# Patient Record
Sex: MALE | Race: WHITE | URBAN - METROPOLITAN AREA
[De-identification: names, ages, dates, MRNs, and addresses within clinical notes are randomized per-mention and may not be internally consistent; named-entity substitution may affect disease eponyms.]

---

## 2017-01-31 ENCOUNTER — TRANSFERRED RECORDS (OUTPATIENT)
Dept: HEALTH INFORMATION MANAGEMENT | Facility: CLINIC | Age: 52
End: 2017-01-31

## 2017-11-21 ENCOUNTER — TRANSFERRED RECORDS (OUTPATIENT)
Dept: HEALTH INFORMATION MANAGEMENT | Facility: CLINIC | Age: 52
End: 2017-11-21

## 2017-12-24 ENCOUNTER — MEDICAL CORRESPONDENCE (OUTPATIENT)
Dept: HEALTH INFORMATION MANAGEMENT | Facility: CLINIC | Age: 52
End: 2017-12-24

## 2018-01-04 NOTE — TELEPHONE ENCOUNTER
APPT INFO    Date /Time: 1/15/18 2PM   Reason for Appt: Rt Hand 3rd MCP OA   Ref Provider/Clinic: Dr. Felipe Patrick   Are there internal records? Yes/No?  IF YES, list clinic names: no   Are there outside records? Yes/No? Yes - see below   Patient Contact (Y/N) & Call Details: No - pt is referred   [facility]     OUTSIDE RECORDS CHECKLIST     CLINIC NAME COMMENTS REC (x) IMG (x)   [facility]  x

## 2018-01-04 NOTE — TELEPHONE ENCOUNTER
APPT INFO    Date /Time: 1/15/18 2PM   Reason for Appt: Rt hand 3rd MCP Osteoarthritis     Ref Provider/Clinic: SOULEYMANE FELIZ/ [facility]   Are there internal records? Yes/No?  IF YES, list clinic names: NO   Are there outside records? Yes/No? YES- see below   Patient Contact (Y/N) & Call Details: NO- referral   Action: Reviewed records     OUTSIDE RECORDS CHECKLIST     CLINIC NAME COMMENTS REC (x) IMG (x)   [facility]  x x           Records Received From: [facility]    Date/Exam/Location  (specify location if different)   Office Notes: 11/21/17   Missing: Additional records/imaging?

## 2018-01-09 NOTE — TELEPHONE ENCOUNTER
Records Received From: [facility]    Date/Exam/Location  (specify location if different)   Office Notes: 11/21/17, 3/14/17, 1/31/17, 1/9/17   Radiology Reports: Right hand 1/31/17  Cleopatra Alarcon Notified (Y/N): no

## 2018-01-09 NOTE — TELEPHONE ENCOUNTER
Received Imaging From: [facility]     Image Type (x): Disc:_X__  Pacs:___      Exam Date/Name: Right hand 1/31/17 Comments: Received 1 CD, will send to Film room along with radiology report attached.

## 2018-01-15 ENCOUNTER — RADIANT APPOINTMENT (OUTPATIENT)
Dept: GENERAL RADIOLOGY | Facility: CLINIC | Age: 53
End: 2018-01-15
Attending: ORTHOPAEDIC SURGERY
Payer: COMMERCIAL

## 2018-01-15 ENCOUNTER — OFFICE VISIT (OUTPATIENT)
Dept: ORTHOPEDICS | Facility: CLINIC | Age: 53
End: 2018-01-15
Payer: COMMERCIAL

## 2018-01-15 ENCOUNTER — PRE VISIT (OUTPATIENT)
Dept: ORTHOPEDICS | Facility: CLINIC | Age: 53
End: 2018-01-15

## 2018-01-15 VITALS — HEIGHT: 71 IN | WEIGHT: 187.7 LBS | BODY MASS INDEX: 26.28 KG/M2

## 2018-01-15 DIAGNOSIS — M79.641 PAIN OF RIGHT HAND: ICD-10-CM

## 2018-01-15 DIAGNOSIS — M19.041 OSTEOARTHRITIS, HAND, PRIMARY LOCALIZED, RIGHT: Primary | ICD-10-CM

## 2018-01-15 DIAGNOSIS — M79.641 PAIN OF RIGHT HAND: Primary | ICD-10-CM

## 2018-01-15 NOTE — NURSING NOTE
"Reason For Visit:   Chief Complaint   Patient presents with     Hand Pain     pt states that his right middle finger has arthritis pt states he has strength to hold anything just fustrating would like to discuss surgery.       Primary MD: Demian Naranjo  Ref. MD:     ?  No    Age: 52 year old    Occupation:.  Currently working? Yes.  Work status?  Full time.  Smoker: Yes  Request smoking cessation information: No      Ht 1.803 m (5' 11\")  Wt 85.1 kg (187 lb 11.2 oz)  BMI 26.18 kg/m2      Pain Assessment  Patient Currently in Pain: Yes  0-10 Pain Scale: 2  Primary Pain Location: Finger (Comment which one)  Pain Orientation: Right    Hand Dominance Evaluation  Hand Dominance: Left            No Known Allergies    Anastacio Rivera CMA    "

## 2018-01-15 NOTE — PROGRESS NOTES
Date of Service: Zain 15, 2018    Chief Complaint:   Chief Complaint   Patient presents with     Hand Pain     pt states that his right middle finger has arthritis pt states he has strength to hold anything just fustrating would like to discuss surgery.       History of Present Illness: Sebastián Queen is a 52 year old, left handed male who presents today for further evaluation of right middle finger MCP joint arthritis. He reports that he has known he has had this problem for many years. [facility] and  it appears that he was seen by Dr. Murray. At one point, a surface replacement arthroplasty was offered but the process was delayed and ultimately, [facility]. He reports that the third MCP joint is swollen and he cannot flex it fully. When he tries to make a full composite fist, the middle finger gets in the way because he cannot get it all the way down. He feels that the finger is causing his  strength to be weak and he no longer feels secure climbing a left bladder as a result. He denies any history of injury to the finger. He reports that he only has very minimal pain, mostly when he over uses it. His primary complaint is dysfunction rather than discomfort. He has not had any treatment with therapy or injections to date. He initially was hoping to have surgery in February or March but is now not sure to increasing john work this time of year.    Review of Systems: A 14-point review of systems was obtained on intake reviewed. It is included at the bottom of this note.     Past Medical History:   Diagnosis Date     Eye trauma, superficial, right, initial encounter 2008         Past Surgical History:   Procedure Laterality Date     BLEPHAROPLASTY Left 10/6/2016    Procedure: BLEPHAROPLASTY;  Surgeon: Giovanni Hamilton MD;  Location:  OR         Current Outpatient Prescriptions:      erythromycin (ROMYCIN) ophthalmic ointment, Apply small amount to incision  "sites three times daily and into operative eye(s) at night., Disp: 3.5 g, Rfl: 0     HYDROcodone-acetaminophen (NORCO) 5-325 MG per tablet, Take 1 tablet by mouth every 6 hours as needed for pain Maximum of 4000 mg of acetaminophen in 24 hours., Disp: 15 tablet, Rfl: 0    No Known Allergies    Social History     Social History     Marital status: Single     Spouse name: N/A     Number of children: N/A     Years of education: N/A     Social History Main Topics     Smoking status: Light Tobacco Smoker     Packs/day: 0.00     Types: Cigarettes     Smokeless tobacco: None      Comment: 1 cigarette/day     Alcohol use Yes      Comment: 2-3 beers/day . (Nothing the week before surg. 10/01/16     Drug use: No     Sexual activity: Not Asked     Other Topics Concern     None     Social History Narrative     Occupation:     Family History   Problem Relation Age of Onset     CANCER Father        Physical examination:  Height 1.803 m (5' 11\"), weight 85.1 kg (187 lb 11.2 oz).    Well-developed, well-nourished and in no acute distress.  Alert and oriented to surroundings.    On examination of the right hand, skin is clean, dry, and intact. There is swelling of the third MCP joint. There is no other obvious deformity. There is no erythema. MCP joint is stable to varus and valgus stress. He is able to make a full composite fist with all but the middle finger, she cannot get down all the way. Passive and active motion are equal. DIP motion is from 0-80 , PIP motion is from 0-100 , and MP motion is from 0-70 . There is pain with MCP grind. Sensation is intact in median, radial, and ulnar nerve distributions.      Radiographs: Three views of the right hand were obtained and reviewed. These demonstrate joint space narrowing and slight subluxation of the third MCP joint.      Assessment: 52 year old male with third MCP osteoarthritis    Plan:  At this point, the patient reports he has minimal pain. His primary " complaint is weakness and loss of motion. He has not had any injections or therapy. I discussed with him that the primary indication for MP arthroplasty is pain. It may also help with his range of motion and strength, but the prognosis for this is more guarded. At this point, especially as he is not sure whether or not this would be a good time for him to have surgery, my recommendation would be an injection and hand therapy. If this is not successful, at that point, we can readdress the question of surgical intervention if he so desires. The patient was amenable to this plan. I will put in a referral for him for therapy. He would also like to go ahead and have the injection. However, he is an important business trip coming up tomorrow and wants to be in his best form for this. Therefore he will come back in 3 weeks from today for the procedure.  Answers for HPI/ROS submitted by the patient on 1/15/2018   General Symptoms: No  Skin Symptoms: No  HENT Symptoms: No  EYE SYMPTOMS: No  HEART SYMPTOMS: No  LUNG SYMPTOMS: No  INTESTINAL SYMPTOMS: No  URINARY SYMPTOMS: No  REPRODUCTIVE SYMPTOMS: No  SKELETAL SYMPTOMS: No  BLOOD SYMPTOMS: No  NERVOUS SYSTEM SYMPTOMS: No  MENTAL HEALTH SYMPTOMS: No

## 2018-01-15 NOTE — MR AVS SNAPSHOT
After Visit Summary   1/15/2018    Sebastián Queen    MRN: [id number]           Patient Information     Date Of Birth          1965        Visit Information        Provider Department      1/15/2018 2:00 PM Td Rodriguez MD [facility]        Today's Diagnoses     Osteoarthritis, hand, primary localized, right    -  1       Follow-ups after your visit        Additional Services     HAND THERAPY       Right third MCP arthritis. Primary complaint weakness. Please evaluate for possible strengthening, bracing, or modalities that may be helpful in patient's job as a .                  Your next 10 appointments already scheduled     Feb 07, 2018  2:00 PM CST   (Arrive by 1:45 PM)   OLMAN Hand with Amna Bolden OT   [facility] ([facility])    [address] [place] [id number] [phone]            Feb 07, 2018  3:45 PM CST   (Arrive by 3:30 PM)   RETURN HAND with Td Rodriguez MD   [facility] ([facility])    [address] [place] [address] [phone]              Who to contact     Please call your clinic at [phone] to:    Ask questions about your health    Make or cancel appointments    Discuss your medicines    Learn about your test results    Speak to your doctor   If you have compliments or concerns about an experience at your clinic, or if you wish to file a complaint, please contact [facility] Patient Relations at [phone] or email us at [email or web address]         Additional Information About Your Visit        MyChart Information     [employer] is an electronic gateway that provides easy, online access to your medical records. With [employer], you can request a clinic appointment, read your test results, renew a prescription or communicate with your care team.    "  To sign up for [employer] visit the website at [email or web address]   You will be asked to enter the access code listed below, as well as some personal information. Please follow the directions to create your username and password.     Your access code is: [email or web address]  Expires: 2018  6:30 AM     Your access code will  in 90 days. If you need help or a new code, please contact your [facility] or call [phone] for assistance.        Care EveryWhere ID     This is your Care EveryWhere ID. This could be used by other organizations to access your [place] medical records  [id number]        Your Vitals Were     Height BMI (Body Mass Index)                1.803 m (5' 11\") 26.18 kg/m2           Blood Pressure from Last 3 Encounters:   10/06/16 (!) 147/96    Weight from Last 3 Encounters:   01/15/18 85.1 kg (187 lb 11.2 oz)   10/06/16 84.8 kg (187 lb)              We Performed the Following     HAND THERAPY        Primary Care Provider Office Phone # Fax #    Demian Crowley Jose A [phone]       [facility] [address]        Equal Access to Services     BRAN BRUCE AH: Hadii aad ku hadasho Soomaali, waaxda luqadaha, qaybta kaalmada adeegyada, waxay murtaza haymagalin romana brock. So [facility] [phone].    ATENCIÓN: Si habla español, tiene a herrmann disposición servicios gratuitos de asistencia lingüística. Llame al [phone].    We comply with applicable federal civil rights laws and [place] laws. We do not discriminate on the basis of race, color, national origin, age, disability, sex, sexual orientation, or gender identity.            Thank you!     Thank you for choosing [facility]  for your care. Our goal is always to provide you with excellent care. Hearing back from our patients is one way we can continue to improve our services. Please take a few minutes to complete the written survey that you may " receive in the mail after your visit with us. Thank you!             Your Updated Medication List - Protect others around you: Learn how to safely use, store and throw away your medicines at www.disposemymeds.org.          This list is accurate as of: 1/15/18 11:59 PM.  Always use your most recent med list.                   Brand Name Dispense Instructions for use Diagnosis    erythromycin ophthalmic ointment    ROMYCIN    3.5 g    Apply small amount to incision sites three times daily and into operative eye(s) at night.    Postoperative eye state       HYDROcodone-acetaminophen 5-325 MG per tablet    NORCO    15 tablet    Take 1 tablet by mouth every 6 hours as needed for pain Maximum of 4000 mg of acetaminophen in 24 hours.    Postoperative eye state

## 2018-01-15 NOTE — LETTER
1/15/2018       RE: Sebastián Queen  [address]  [facility] [facility] [address]     Dear Colleague,    Thank you for referring your patient, Sebastián Queen, to the [facility] at [facility]. Please see a copy of my visit note below.    Date of Service: Zain 15, 2018    Chief Complaint:   Chief Complaint   Patient presents with     Hand Pain     pt states that his right middle finger has arthritis pt states he has strength to hold anything just fustrating would like to discuss surgery.       History of Present Illness: Sebastián Queen is a 52 year old, left handed male who presents today for further evaluation of right middle finger MCP joint arthritis. He reports that he has known he has had this problem for many years. [facility] and  [facility] that he was seen by Dr. Murray. At one point, a surface replacement arthroplasty was offered but the process was delayed and ultimately, [facility]. He reports that the third MCP joint is swollen and he cannot flex it fully. When he tries to make a full composite fist, the middle finger gets in the way because he cannot get it all the way down. He feels that the finger is causing his  strength to be weak and he no longer feels secure climbing a left bladder as a result. He denies any history of injury to the finger. He reports that he only has very minimal pain, mostly when he over uses it. His primary complaint is dysfunction rather than discomfort. He has not had any treatment with therapy or injections to date. He initially was hoping to have surgery in February or March but is now not sure to increasing john work this time of year.    Review of Systems: A 14-point review of systems was obtained on intake reviewed. It is included at the bottom of this note.     Past Medical History:   Diagnosis Date     Eye trauma, superficial, right, initial encounter 2008         Past  "Surgical History:   Procedure Laterality Date     BLEPHAROPLASTY Left 10/6/2016    Procedure: BLEPHAROPLASTY;  Surgeon: Giovanni Hamilton MD;  Location:  OR         Current Outpatient Prescriptions:      erythromycin (ROMYCIN) ophthalmic ointment, Apply small amount to incision sites three times daily and into operative eye(s) at night., Disp: 3.5 g, Rfl: 0     HYDROcodone-acetaminophen (NORCO) 5-325 MG per tablet, Take 1 tablet by mouth every 6 hours as needed for pain Maximum of 4000 mg of acetaminophen in 24 hours., Disp: 15 tablet, Rfl: 0    No Known Allergies    Social History     Social History     Marital status: Single     Spouse name: N/A     Number of children: N/A     Years of education: N/A     Social History Main Topics     Smoking status: Light Tobacco Smoker     Packs/day: 0.00     Types: Cigarettes     Smokeless tobacco: None      Comment: 1 cigarette/day     Alcohol use Yes      Comment: 2-3 beers/day . (Nothing the week before surg. 10/01/16     Drug use: No     Sexual activity: Not Asked     Other Topics Concern     None     Social History Narrative     Occupation:     Family History   Problem Relation Age of Onset     CANCER Father        Physical examination:  Height 1.803 m (5' 11\"), weight 85.1 kg (187 lb 11.2 oz).    Well-developed, well-nourished and in no acute distress.  Alert and oriented to surroundings.    On examination of the right hand, skin is clean, dry, and intact. There is swelling of the third MCP joint. There is no other obvious deformity. There is no erythema. MCP joint is stable to varus and valgus stress. He is able to make a full composite fist with all but the middle finger, she cannot get down all the way. Passive and active motion are equal. DIP motion is from 0-80 , PIP motion is from 0-100 , and MP motion is from 0-70 . There is pain with MCP grind. Sensation is intact in median, radial, and ulnar nerve distributions.      Radiographs: Three views " of the right hand were obtained and reviewed. These demonstrate joint space narrowing and slight subluxation of the third MCP joint.      Assessment: 52 year old male with third MCP osteoarthritis    Plan:  At this point, the patient reports he has minimal pain. His primary complaint is weakness and loss of motion. He has not had any injections or therapy. I discussed with him that the primary indication for MP arthroplasty is pain. It may also help with his range of motion and strength, but the prognosis for this is more guarded. At this point, especially as he is not sure whether or not this would be a good time for him to have surgery, my recommendation would be an injection and hand therapy. If this is not successful, at that point, we can readdress the question of surgical intervention if he so desires. The patient was amenable to this plan. I will put in a referral for him for therapy. He would also like to go ahead and have the injection. However, he is an important business trip coming up tomorrow and wants to be in his best form for this. Therefore he will come back in 3 weeks from today for the procedure.      Again, thank you for allowing me to participate in the care of your patient.      Sincerely,    Td Rodriguez MD

## 2018-02-07 ENCOUNTER — THERAPY VISIT (OUTPATIENT)
Dept: OCCUPATIONAL THERAPY | Facility: CLINIC | Age: 53
End: 2018-02-07
Payer: COMMERCIAL

## 2018-02-07 ENCOUNTER — OFFICE VISIT (OUTPATIENT)
Dept: ORTHOPEDICS | Facility: CLINIC | Age: 53
End: 2018-02-07
Payer: COMMERCIAL

## 2018-02-07 VITALS — WEIGHT: 187 LBS | BODY MASS INDEX: 26.18 KG/M2 | HEIGHT: 71 IN

## 2018-02-07 DIAGNOSIS — M19.041 ARTHRITIS OF RIGHT HAND: ICD-10-CM

## 2018-02-07 DIAGNOSIS — M19.041 OSTEOARTHRITIS OF FINGER, RIGHT: Primary | ICD-10-CM

## 2018-02-07 DIAGNOSIS — M79.644 PAIN OF FINGER OF RIGHT HAND: Primary | ICD-10-CM

## 2018-02-07 PROCEDURE — 97165 OT EVAL LOW COMPLEX 30 MIN: CPT | Mod: GO | Performed by: OCCUPATIONAL THERAPIST

## 2018-02-07 PROCEDURE — 97535 SELF CARE MNGMENT TRAINING: CPT | Mod: GO | Performed by: OCCUPATIONAL THERAPIST

## 2018-02-07 PROCEDURE — 97760 ORTHOTIC MGMT&TRAING 1ST ENC: CPT | Mod: GO | Performed by: OCCUPATIONAL THERAPIST

## 2018-02-07 RX ORDER — BETAMETHASONE SODIUM PHOSPHATE AND BETAMETHASONE ACETATE 3; 3 MG/ML; MG/ML
6 INJECTION, SUSPENSION INTRA-ARTICULAR; INTRALESIONAL; INTRAMUSCULAR; SOFT TISSUE ONCE
Qty: 1 ML | Refills: 0 | OUTPATIENT
Start: 2018-02-07 | End: 2018-02-07

## 2018-02-07 RX ORDER — LIDOCAINE HYDROCHLORIDE 10 MG/ML
1 INJECTION, SOLUTION INFILTRATION; PERINEURAL ONCE
Qty: 1 ML | Refills: 0 | OUTPATIENT
Start: 2018-02-07 | End: 2018-02-07

## 2018-02-07 NOTE — PROGRESS NOTES
Reason for visit: Follow-up right third MCP arthritis    Interval events: The patient comes to see me in follow-up for his right middle finger MCP arthritis. He reports that he continues to have intermittent discomfort in the right middle finger MCP joint when he bumps it against something. He also has discomfort in it when he is very active, such as with climbing a lot of ladders. It is not painful at rest. Motion is somewhat limited and he feels his  strength is affected.    Review systems: Positive for right hand pain as described above    On examination of the right hand, skin is clean, dry, and intact. There is swelling of the third MCP joint. There is no other obvious deformity. There is no erythema. MCP joint is stable to varus and valgus stress. He is able to make a full composite fist with all but the middle finger, which he cannot get down all the way. Passive and active motion are equal. DIP motion is from 0-80 , PIP motion is from 0-100 , and MP motion is from 0-70 . There is pain with MCP grind. Sensation is intact in median, radial, and ulnar nerve distributions.    Radiographs: Three views of the right hand were obtained 1/15 and reviewed. These demonstrate joint space narrowing and slight subluxation of the third MCP joint.    Assessment: Right third MCP arthritis    Plan: The patient received a custom splint from hand therapy today. We have previously discussed the management options including observation, splinting, injection, and surgery. He is interested in surgery. He is a candidate for a MCP surface replacement but I have recommended he try injection prior to proceeding with surgery. Injection was performed today and his right third MTP joint. Procedure known as below. He tolerated it well. If this does not control his symptoms adequately, he would like to go ahead with surgery in December or January so he will see me in about 6 months from now.         Procedure:   After written informed  consent was obtained, the patient's right dorsal hand was prepped with chloraprep.  1 mL of a 1:1 mixture of 30 mg/5 mL betamethasone and 1% lidocaine was injected into the right third finger MCP joint.  There were no immediate complications.

## 2018-02-07 NOTE — NURSING NOTE
[facility]  [address] [place] [id number]  Dept: [phone]  ______________________________________________________________________________    Patient: Sebastián uQeen   : 1965   MRN: [id number]   2018    INVASIVE PROCEDURE SAFETY CHECKLIST    Date: 2018   Procedure:Right third mcp joint steroid injection  Patient Name: Sebastián Queen  MRN: [id number]  YOB: 1965    Action: Complete sections as appropriate. Any discrepancy results in a HARD COPY until resolved.     PRE PROCEDURE:  Patient ID verified with 2 identifiers (name and  or MRN): Yes  Procedure and site verified with patient/designee (when able): Yes  Accurate consent documentation in medical record: Yes  H&P (or appropriate assessment) documented in medical record: NA  H&P must be up to 20 days prior to procedure and updates within 24 hours of procedure as applicable: NA  Relevant diagnostic and radiology test results appropriately labeled and displayed as applicable: NA  Procedure site(s) marked with provider initials: NA    TIMEOUT:  Time-Out performed immediately prior to starting procedure, including verbal and active participation of all team members addressing the following:Yes  * Correct patient identify  * Confirmed that the correct side and site are marked  * An accurate procedure consent form  * Agreement on the procedure to be done  * Correct patient position  * Relevant images and results are properly labeled and appropriately displayed  * The need to administer antibiotics or fluids for irrigation purposes during the procedure as applicable   * Safety precautions based on patient history or medication use    DURING PROCEDURE: Verification of correct person, site, and procedures any time the responsibility for care of the patient is transferred to another member of the care team.       The following medications were given:     MEDICATION:   Celestone 30 mg  ROUTE: IA  SITE: right third mcp joint  DOSE: 1cc  LOT #: [id number]  : [employer]  EXPIRATION DATE: 07/2019  NDC#: 4886-1529-43   Was there drug waste? Yes  Amount of drug waste (mL): 4.  Reason for waste:  Single use vial    MEDICATION:  Lidocaine without epinephrine  ROUTE: IA  SITE: right third mcp  joint  DOSE: 1cc  LOT #: [id number]  : [employer]  EXPIRATION DATE: 09/21  NDC#: 31179-136-15   Was there drug waste? Yes  Amount of drug waste (mL): 19.  Reason for waste:  Single use vial      Anastacio Rivera CMA  February 7, 2018          Anastacio Rivera CMA

## 2018-02-07 NOTE — MR AVS SNAPSHOT
"              After Visit Summary   2/7/2018    Sebastián Queen    MRN: [id number]           Patient Information     Date Of Birth          1965        Visit Information        Provider Department      2/7/2018 2:00 PM Amna Bolden OT [facility]        Today's Diagnoses     Pain of finger of right hand    -  1    Arthritis of right hand           Follow-ups after your visit        Your next 10 appointments already scheduled     Feb 07, 2018  3:45 PM CST   (Arrive by 3:30 PM)   RETURN HAND with Td Rodriguez MD   [facility] ([facility])    [address] [place] [address] [phone]              Who to contact     If you have questions or need follow up information about today's clinic visit or your schedule please contact [facility] directly at [phone].  Normal or non-critical lab and imaging results will be communicated to you by MyChart, letter or phone within 4 business days after the clinic has received the results. If you do not hear from us within 7 days, please contact the clinic through [employer] or phone. If you have a critical or abnormal lab result, we will notify you by phone as soon as possible.  Submit refill requests through [employer] or call your pharmacy and they will forward the refill request to us. Please allow 3 business days for your refill to be completed.          Additional Information About Your Visit        MyChart Information     [employer] lets you send messages to your doctor, view your test results, renew your prescriptions, schedule appointments and more. To sign up, go to [email or web address] . Click on \"Log in\" on the left side of the screen, which will take you to the Welcome page. Then click on \"Sign up Now\" on the right side of the page.     You will be asked to enter the access code listed below, as well as some personal information. Please follow the directions " to create your username and password.     Your access code is: [email or web address]  Expires: 2018  6:30 AM     Your access code will  in 90 days. If you need help or a new code, please call your [place] clinic or [phone].        Care EveryWhere ID     This is your Care EveryWhere ID. This could be used by other organizations to access your [place] medical records  [id number]         Blood Pressure from Last 3 Encounters:   10/06/16 (!) 147/96    Weight from Last 3 Encounters:   01/15/18 85.1 kg (187 lb 11.2 oz)   10/06/16 84.8 kg (187 lb)              We Performed the Following     HC OT EVAL, LOW COMPLEXITY     ORTHOTIC MGMT AND TRAINING, EACH 15 MIN     SELF CARE MNGMENT TRAINING        Primary Care Provider Office Phone # Fax #    Demian Naranjo [phone]       [facility] [address]        Equal Access to Services     SIOBHAN BRUCE : Hadii aad ku hadasho Soomaali, waaxda luqadaha, qaybta kaalmada adeegyada, waxay idiin hayaan nelsoneg kharash la'magalin . So [facility] [phone].    ATENCIÓN: Si habla español, tiene a herrmann disposición servicios gratuitos de asistencia lingüística. Llame al [phone].    We comply with applicable federal civil rights laws and [place] laws. We do not discriminate on the basis of race, color, national origin, age, disability, sex, sexual orientation, or gender identity.            Thank you!     Thank you for choosing  [facility]  for your care. Our goal is always to provide you with excellent care. Hearing back from our patients is one way we can continue to improve our services. Please take a few minutes to complete the written survey that you may receive in the mail after your visit with us. Thank you!             Your Updated Medication List - Protect others around you: Learn how to safely use, store and throw away your medicines at www.disposemymeds.org.          This list is accurate as of 18  3:06 PM.   Always use your most recent med list.                   Brand Name Dispense Instructions for use Diagnosis    erythromycin ophthalmic ointment    ROMYCIN    3.5 g    Apply small amount to incision sites three times daily and into operative eye(s) at night.    Postoperative eye state

## 2018-02-07 NOTE — LETTER
2/7/2018       RE: Sebastián Queen  [address]  [place] [address]     Dear Colleague,    Thank you for referring your patient, Sebastián Queen, to the [facility] at [facility]. Please see a copy of my visit note below.    Reason for visit: Follow-up right third MCP arthritis    Interval events: The patient comes to see me in follow-up for his right middle finger MCP arthritis. He reports that he continues to have intermittent discomfort in the right middle finger MCP joint when he bumps it against something. He also has discomfort in it when he is very active, such as with climbing a lot of ladders. It is not painful at rest. Motion is somewhat limited and he feels his  strength is affected.    Review systems: Positive for right hand pain as described above    On examination of the right hand, skin is clean, dry, and intact. There is swelling of the third MCP joint. There is no other obvious deformity. There is no erythema. MCP joint is stable to varus and valgus stress. He is able to make a full composite fist with all but the middle finger, which he cannot get down all the way. Passive and active motion are equal. DIP motion is from 0-80 , PIP motion is from 0-100 , and MP motion is from 0-70 . There is pain with MCP grind. Sensation is intact in median, radial, and ulnar nerve distributions.    Radiographs: Three views of the right hand were obtained 1/15 and reviewed. These demonstrate joint space narrowing and slight subluxation of the third MCP joint.    Assessment: Right third MCP arthritis    Plan: The patient received a custom splint from hand therapy today. We have previously discussed the management options including observation, splinting, injection, and surgery. He is interested in surgery. He is a candidate for a MCP surface replacement but I have recommended he try injection prior to proceeding with surgery. Injection was performed  today and his right third MTP joint. Procedure known as below. He tolerated it well. If this does not control his symptoms adequately, he would like to go ahead with surgery in December or January so he will see me in about 6 months from now.         Procedure:   After written informed consent was obtained, the patient's right dorsal hand was prepped with chloraprep.  1 mL of a 1:1 mixture of 30 mg/5 mL betamethasone and 1% lidocaine was injected into the right third finger MCP joint.  There were no immediate complications.          Again, thank you for allowing me to participate in the care of your patient.      Sincerely,    Td Rodriguez MD

## 2018-02-07 NOTE — PROGRESS NOTES
Hand Therapy Initial Evaluation    Current Date:  2/7/2018    Diagnosis:  Right  hand MCP OA,  Long finger  DOI:  about 5 years ago, started    Referring MD: Td Rodriguez MD    Next MD visit: PRN      Initial Subjective:  Sebastián Queen is a 52 year old  left  hand dominant male.    Patient reports symptoms of pain, stiffness/loss of motion and weakness/loss of strength of the right  long finger  which occurred due to overuse, and  chronic. Since onset symptoms are Unchanged  Special tests:  x-ray.  Previous treatment: none.    General health as reported by patient is good.  Pertinent medical history includes:osteoarthritis  Medical allergies:none.  Surgical history: cancer: skin2.  Medication history: none.  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  Currently working in normal job without restrictions  Job Tasks: prolonged standing, lifting/carrying, pushing/pulling, repetitive tasks  Prior functional level:  no limitations  Barriers include:none  Mobility: No difficulty  Transportation: drives  Leisure activities/hobbies: hunting, fishing, home repair  Additional Occupational Profile Information (patterns of daily living, interests, values and needs):Pt reports difficulty with dressing, sleeping, work , sports/recreation, pushing, pulling, lifting and carrying    Functional Outcome Measure:  See flowsheet      Objective:  Observation: mild redness at the dorsal MCP joint    PAIN 2/7/2018     Location Right  long finger MCP Joint     Description  Aching and Dull     Radiates no     Worse d/n daytime     Frequency activity dependant     Exacerbated by Gripping, pressure grasping     Relieves rest     At rest 0-10/10 1/10     On use 0-10/10 3/10     At worst.0-10/10 5/10     Sleep disruption?   yes     Progression Unchanged         ROM:    Date: 2/7/2018 2/7/2018   long finger  AROM  (PROM) Right Left        MCP  /75 /88   PIP  /104 /92    DIP  /74 /72   TROM          STRENGTH:   (Measured in pounds)     2018      Trials Right Left Right Left   1  2  3  Av 105         3 pt Pinch 2018      Trials Right Left Right Left   1  2  3  Av 18       Lateral Pinch 2018      Trials Right Left Right Left   1  2  3  Av 20           Edema:  none of the  long finger  Palpation:  []     Normal        [x]   Tender       [x]  Mild         [x]   Moderate []   Severe   Location: R LF MCP      Sensation:  WNL throughout all nerve distributions; per patient report      Assessment:   Patient presents with symptoms consistent with above diagnosis,  with conservative intervention.     Patient's limitations or Problem List includes:  Pain, Decreased ROM/motion and Weakness of the right long finger MCP joint which interferes with the patient's ability to perform Self Care Tasks (dressing), Work Tasks, Sleep Patterns, Recreational Activities, Household Chores and Driving  as compared to previous level of function.    Rehab Potential:  Good - Return to full activity, some limitations    Patient will benefit from skilled Occupational Therapy to decrease pain to return to previous activity level and resume normal daily tasks and to reach their rehab potential.    Barriers to Learning:  No barrier    Communication Issues:  Patient appears to be able to clearly communicate and understand verbal and written communication and follow directions correctly.  Chart Review: Chart Review, Brief history including review of medical and/or therapy records relating to the presenting problem and Simple history review with patient    Identified Performance Deficits: dressing, home establishment and management, work, play and leisure activities    Assessment of Occupational Performance:  1-3 Performance Deficits    Clinical Decision Making (Complexity): Low complexity    Treatment Explanation:  The following has been discussed with the patient:  RX ordered/plan of care  Anticipated  outcomes  Possible risks and side effects    Treatment Plan:   Frequency:  1 x visit  Duration:  NA; 1 x visit      Self Care:  Self Care Tasks and self management skills  Orthotic Fabrication: Static hand based resting orthosis with MCP at functional resting posture for night only  Discharge Plan:  Achieve all LTG.  Independent in home treatment program.  Reach maximal therapeutic benefit.    Home Exercise Program:  Wear  hand based resting orthosis with MCP at functional resting posture for night only

## 2018-02-07 NOTE — NURSING NOTE
"Reason For Visit:   Chief Complaint   Patient presents with     RECHECK     The patient is following up today with right 3rd MCP osteoarthritis.        Primary MD: Demian Naranjo  Ref. MD: Sechriest    ?  No    Age: 52 year old      Date of injury: chronic  Type of injury: osteoarthritis .        Ht 1.803 m (5' 11\")  Wt 84.8 kg (187 lb)  BMI 26.08 kg/m2      Pain Assessment  Patient Currently in Pain: Denies    Hand Dominance Evaluation  Hand Dominance: Left  Pinch force  R hand key force: 6.804 kg (15 lb)  L hand key force: 9.979 kg (22 lb)   force  R hand  level 2 force: 27.2 kg (60 lb)  L hand  level  2 force: 49.9 kg (110 lb)    QuickDASH Assessment  No flowsheet data found.       No Known Allergies    Angela Quan, ATC  "

## 2018-02-07 NOTE — MR AVS SNAPSHOT
After Visit Summary   2018    Sebastián Queen    MRN: [id number]           Patient Information     Date Of Birth          1965        Visit Information        Provider Department      2018 3:45 PM Td Rodriguez MD [facility]        Today's Diagnoses     Osteoarthritis of finger, right    -  1       Follow-ups after your visit        Your next 10 appointments already scheduled     Aug 29, 2018  3:15 PM CDT   (Arrive by 3:00 PM)   RETURN HAND with Td Rodriguez MD   [facility] ([facility])    [address] [place] [address] [phone]              Who to contact     Please call your clinic at [phone] to:    Ask questions about your health    Make or cancel appointments    Discuss your medicines    Learn about your test results    Speak to your doctor   If you have compliments or concerns about an experience at your clinic, or if you wish to file a complaint, please contact [facility] Patient Relations at [phone] or email us at [email or web address]         Additional Information About Your Visit        MyChart Information     [employer] is an electronic gateway that provides easy, online access to your medical records. With [employer], you can request a clinic appointment, read your test results, renew a prescription or communicate with your care team.     To sign up for [employer] visit the website at [email or web address]   You will be asked to enter the access code listed below, as well as some personal information. Please follow the directions to create your username and password.     Your access code is: [email or web address]  Expires: 2018  6:30 AM     Your access code will  in 90 days. If you need help or a new code, please contact your [facility] or call [phone] for assistance.        Care  "EveryWhere ID     This is your Care EveryWhere ID. This could be used by other organizations to access your [place] medical records  [id number]        Your Vitals Were     Height BMI (Body Mass Index)                1.803 m (5' 11\") 26.08 kg/m2           Blood Pressure from Last 3 Encounters:   10/06/16 (!) 147/96    Weight from Last 3 Encounters:   02/07/18 84.8 kg (187 lb)   01/15/18 85.1 kg (187 lb 11.2 oz)   10/06/16 84.8 kg (187 lb)              We Performed the Following     Small Joint/Bursa injection and/or drainage (Finger) [20600]          Today's Medication Changes          These changes are accurate as of 2/7/18  4:39 PM.  If you have any questions, ask your nurse or doctor.               Start taking these medicines.        Dose/Directions    betamethasone acet & sod phos 6 (3-3) MG/ML Susp injection   Commonly known as:  CELESTONE   Used for:  Osteoarthritis of finger, right   Started by:  Td Rodriguez MD        Dose:  6 mg   1 mL (6 mg) by INTRA-ARTICULAR route once for 1 dose   Quantity:  1 mL   Refills:  0       lidocaine 1 % injection   Used for:  Osteoarthritis of finger, right   Started by:  Td Rodriguez MD        Dose:  1 mL   1 mL by INTRA-ARTICULAR route once for 1 dose   Quantity:  1 mL   Refills:  0            Where to get your medicines      Some of these will need a paper prescription and others can be bought over the counter.  Ask your nurse if you have questions.     You don't need a prescription for these medications     betamethasone acet & sod phos 6 (3-3) MG/ML Susp injection    lidocaine 1 % injection                Primary Care Provider Office Phone # Fax #    Demain Naranjo [phone]       [facility] [address]   [facility] [address]        Equal Access to Services     BRAN BRUCE AH: Liss Martinez, dev rotiz, monica kaalmario mello. So wa " [phone].    ATENCIÓN: Si mary laws, tiene a herrmann disposición servicios gratuitos de asistencia lingüística. Iman erwin [phone].    We comply with applicable federal civil rights laws and [place] laws. We do not discriminate on the basis of race, color, national origin, age, disability, sex, sexual orientation, or gender identity.            Thank you!     Thank you for choosing [facility]  for your care. Our goal is always to provide you with excellent care. Hearing back from our patients is one way we can continue to improve our services. Please take a few minutes to complete the written survey that you may receive in the mail after your visit with us. Thank you!             Your Updated Medication List - Protect others around you: Learn how to safely use, store and throw away your medicines at www.disposemymeds.org.          This list is accurate as of 2/7/18  4:39 PM.  Always use your most recent med list.                   Brand Name Dispense Instructions for use Diagnosis    betamethasone acet & sod phos 6 (3-3) MG/ML Susp injection    CELESTONE    1 mL    1 mL (6 mg) by INTRA-ARTICULAR route once for 1 dose    Osteoarthritis of finger, right       erythromycin ophthalmic ointment    ROMYCIN    3.5 g    Apply small amount to incision sites three times daily and into operative eye(s) at night.    Postoperative eye state       lidocaine 1 % injection     1 mL    1 mL by INTRA-ARTICULAR route once for 1 dose    Osteoarthritis of finger, right

## (undated) RX ORDER — BETAMETHASONE SODIUM PHOSPHATE AND BETAMETHASONE ACETATE 3; 3 MG/ML; MG/ML
INJECTION, SUSPENSION INTRA-ARTICULAR; INTRALESIONAL; INTRAMUSCULAR; SOFT TISSUE
Status: DISPENSED
Start: 2018-02-07

## (undated) RX ORDER — LIDOCAINE HYDROCHLORIDE 10 MG/ML
INJECTION, SOLUTION INFILTRATION; PERINEURAL
Status: DISPENSED
Start: 2018-02-07